# Patient Record
Sex: FEMALE | Race: WHITE
[De-identification: names, ages, dates, MRNs, and addresses within clinical notes are randomized per-mention and may not be internally consistent; named-entity substitution may affect disease eponyms.]

---

## 2020-08-03 ENCOUNTER — HOSPITAL ENCOUNTER (OUTPATIENT)
Dept: HOSPITAL 35 - MRI | Age: 60
End: 2020-08-03
Attending: FAMILY MEDICINE
Payer: COMMERCIAL

## 2020-08-03 DIAGNOSIS — G93.9: ICD-10-CM

## 2020-08-03 DIAGNOSIS — I67.89: Primary | ICD-10-CM

## 2020-08-03 DIAGNOSIS — R90.82: ICD-10-CM

## 2020-08-03 DIAGNOSIS — R42: ICD-10-CM

## 2020-11-25 ENCOUNTER — HOSPITAL ENCOUNTER (OUTPATIENT)
Dept: HOSPITAL 96 - M.CRD | Age: 60
End: 2020-11-25
Payer: COMMERCIAL

## 2020-11-25 DIAGNOSIS — Z96.89: ICD-10-CM

## 2020-11-25 DIAGNOSIS — R93.89: Primary | ICD-10-CM

## 2020-11-25 DIAGNOSIS — R41.3: ICD-10-CM

## 2020-11-25 NOTE — 2DMMODE
[place] [address]
Phone:  [phone] 2 D/M-MODE ECHOCARDIOGRAM     
_______________________________________________________________________________
 
Name:         ABILIO DE LA ROSA         Room:                     REG CLI
M.R.#:    [id number]     Account #:     [id number]  
Admission:    20    Attend Phys:   Jose Carlos Gabriel,
Discharge:                Date of Birth: [birth year]  
Date of Service: 20 1218  Report #:      [id number]
        [id number]
_______________________________________________________________________________
THIS REPORT FOR:
 
cc:  Janae Boyd MD, Nora P. MD Holkins,Juan Alberto NEWMAN MD [facility]       
                                                                       ~
 
--------------- APPROVED REPORT --------------
 
 
Study performed:  2020 08:06:59
 
EXAM: Comprehensive 2D, Doppler, and color-flow Echocardiogram / 
Bubble Study 
Patient Location: Out-Patient   
 
      BSA:         1.86
HR: 75 bpm BP:          120/72 mmHg 
 
Other Information 
Study Quality: Good
 
Indications
CVA/TIA 
PFO?
 
Echo Enhancing Agent
Indication: Rule out Shunt
Agent(s) / Amount(s) Used: Agitated Saline 10 cc
 
2D Dimensions
IVSd:  11.30 (7-11mm) LVOT Diam:  19.60 (18-24mm) 
LVDd:  45.36 mm  
PWd:  10.55 (7-11mm) Ascending Ao:  29.37 (22-36mm)
LVDs:  28.86 (25-40mm) 
Aortic Root:  28.03 mm 
 
Volumes
Left Atrial Volume (Systole) 
    LA ESV Index:  14.90 mL/m2
 
Aortic Valve
AoV Peak Sb.:  1.16 m/s 
AO Peak Gr.:  5.39 mmHg  LVOT Max PG:  3.07 mmHg
AO Mean Gr.:  2.63 mmHg  LVOT Mean P.51 mmHg
    LVOT Max V:  0.88 m/s
 
 
[place] [address]
Phone:  [phone]                     2 D/M-MODE ECHOCARDIOGRAM     
_______________________________________________________________________________
 
Name:         ABILIO DE LA ROSA         Room:                     REG CLI
M.R.#:    [id number]     Account #:     [id number]  
Admission:    20    Attend Phys:   Jose Carlos Gabriel,
Discharge:                Date of Birth: [birth year]  
Date of Service: 20 1218  Report #:      [id number]
        [id number]
_______________________________________________________________________________
AO V2 VTI:  26.63 cm  LVOT Mean V:  0.57 m/s
CHACHA (VTI):  2.39 cm2  LVOT V1 VTI:  21.06 cm
 
Mitral Valve
    E/A Ratio:  1.10
    MV Decel. Time:  182.33 ms
MV E Max Sb.:  0.50 m/s 
MV PHT:  52.88 ms  
MVA (PHT):  4.16 cm2  
 
TDI
E/Lateral E':  3.85 E/Medial E':  7.14
   Medial E' Sb.:  0.07 m/s
   Lateral E' Sb.:  0.13 m/s
 
Pulmonary Valve
PV Peak Sb.:  0.82 m/s PV Peak Gr.:  2.68 mmHg
 
Left Ventricle
The left ventricle is normal size. There is normal LV segmental wall 
motion. There is normal left ventricular wall thickness. Left 
ventricular systolic function is normal. The left ventricular 
ejection fraction is within the normal range. LVEF is 55-60%. The 
left ventricular diastolic function is normal.
 
Right Ventricle
The right ventricle is normal size. The right ventricular systolic 
function is normal.
 
Atria
The left atrium size is normal. Injection of bubbles documented no 
interatrial shunt. The right atrium size is normal.
 
Aortic Valve
The aortic valve is normal in structure. No aortic regurgitation is 
present. There is no aortic valvular stenosis.
 
Mitral Valve
The mitral valve is normal in structure. There is no mitral valve 
regurgitation noted. No evidence of mitral valve stenosis.
 
Tricuspid Valve
The tricuspid valve is normal in structure. There is no tricuspid 
valve regurgitation noted.
 
Pulmonic Valve
 
 
[place] [address]
Phone:  [phone]                     2 D/M-MODE ECHOCARDIOGRAM     
_______________________________________________________________________________
 
Name:         ESTRELLAABILIO TRAMMELL         Room:                     REG CLI
M.R.#:    [id number]     Account #:     [id number]  
Admission:    20    Attend Phys:   Jose Carlos Gabriel,
Discharge:                Date of Birth: [birth year]  
Date of Service: 20 1218  Report #:      [id number]
        [id number]
_______________________________________________________________________________
The pulmonary valve is normal in structure. There is no pulmonic 
valvular regurgitation.
 
Great Vessels
The aortic root is normal in size. IVC is normal in size and 
collapses >50% with inspiration.
 
Pericardium
There is no pericardial effusion.
 
<Conclusion>
The left ventricle is normal size.
There is normal left ventricular wall thickness.
Left ventricular systolic function is normal.
The left ventricular ejection fraction is within the normal 
range.
LVEF is 55-60%.
The left ventricular diastolic function is normal.
The right ventricle is normal size.
The left atrium size is normal.
The aortic valve is normal in structure.
The mitral valve is normal in structure.
The tricuspid valve is normal in structure.
IVC is normal in size and collapses >50% with inspiration.
There is no pericardial effusion.
There is normal LV segmental wall motion.
Injection of bubbles documented no interatrial shunt.
 
 
 
 
 
 
 
 
 
 
 
 
 
 
 
 
 
  <ELECTRONICALLY SIGNED>
                                           By: Juan Alberto Panda MD, FACC     
  20     1218
D: 208   _____________________________________
T: 20   Juan Alberto Panda MD, FACC       /INF

## 2020-11-28 LAB — ANTI-SSA: 4 AI (ref 0–0.9)

## 2021-02-03 ENCOUNTER — HOSPITAL ENCOUNTER (OUTPATIENT)
Dept: HOSPITAL 96 - M.RAD | Age: 61
End: 2021-02-03
Attending: INTERNAL MEDICINE
Payer: COMMERCIAL

## 2021-02-03 DIAGNOSIS — Z12.31: Primary | ICD-10-CM

## 2021-02-03 DIAGNOSIS — M79.641: ICD-10-CM

## 2021-02-03 DIAGNOSIS — M48.02: ICD-10-CM

## 2021-02-03 DIAGNOSIS — M79.642: ICD-10-CM

## 2021-02-03 DIAGNOSIS — M25.78: ICD-10-CM

## 2021-02-03 DIAGNOSIS — M19.071: ICD-10-CM

## 2021-02-03 DIAGNOSIS — M19.072: ICD-10-CM

## 2021-12-24 ENCOUNTER — HOSPITAL ENCOUNTER (EMERGENCY)
Dept: HOSPITAL 35 - ER | Age: 61
Discharge: HOME | End: 2021-12-24
Payer: COMMERCIAL

## 2021-12-24 VITALS — DIASTOLIC BLOOD PRESSURE: 91 MMHG | SYSTOLIC BLOOD PRESSURE: 144 MMHG

## 2021-12-24 VITALS — HEIGHT: 65 IN | WEIGHT: 164.99 LBS | BODY MASS INDEX: 27.49 KG/M2

## 2021-12-24 DIAGNOSIS — I10: ICD-10-CM

## 2021-12-24 DIAGNOSIS — R51.9: Primary | ICD-10-CM

## 2021-12-24 DIAGNOSIS — Z20.822: ICD-10-CM

## 2021-12-24 DIAGNOSIS — Z79.899: ICD-10-CM
